# Patient Record
Sex: FEMALE | Race: WHITE | NOT HISPANIC OR LATINO | URBAN - NONMETROPOLITAN AREA
[De-identification: names, ages, dates, MRNs, and addresses within clinical notes are randomized per-mention and may not be internally consistent; named-entity substitution may affect disease eponyms.]

---

## 2018-09-27 ENCOUNTER — APPOINTMENT (RX ONLY)
Dept: URBAN - NONMETROPOLITAN AREA CLINIC 13 | Facility: CLINIC | Age: 23
Setting detail: DERMATOLOGY
End: 2018-09-27

## 2018-09-27 DIAGNOSIS — Z41.9 ENCOUNTER FOR PROCEDURE FOR PURPOSES OTHER THAN REMEDYING HEALTH STATE, UNSPECIFIED: ICD-10-CM

## 2018-09-27 PROCEDURE — ? MEDICAL CONSULTATION: DYNAMIC RHYTIDES

## 2018-09-27 PROCEDURE — ? ADDITIONAL NOTES

## 2018-09-27 NOTE — PROCEDURE: ADDITIONAL NOTES
Additional Notes: patient is interested in botox to her forehead and glabella.  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  she has never had botox before.  had recent dermal filler to her lips.  she feels her lips are still slightly lumpy.  given her photo shoot tomorrow, she will wait for botox until after her shoot (given slight chance of bruising).  we also discussed dissolving some of her  her lips should the lump persist.  she may be interested in a small amount of filler to her smile lines, but again, discussed risk of bruising and she will wait until no important photo shoot is pending.  return early next week for botox.  $75 consultation fee collected today.
Detail Level: Simple

## 2018-10-03 ENCOUNTER — APPOINTMENT (RX ONLY)
Dept: URBAN - NONMETROPOLITAN AREA CLINIC 13 | Facility: CLINIC | Age: 23
Setting detail: DERMATOLOGY
End: 2018-10-03

## 2018-10-03 DIAGNOSIS — Z41.9 ENCOUNTER FOR PROCEDURE FOR PURPOSES OTHER THAN REMEDYING HEALTH STATE, UNSPECIFIED: ICD-10-CM

## 2018-10-03 PROCEDURE — ? BOTOX

## 2018-10-03 NOTE — PROCEDURE: BOTOX
Periorbital Skin Units: 0
Glabellar Complex Units: 20
Expiration Date (Month Year): 04/2021
Consent: Written consent obtained. Risks include but not limited to lid/brow ptosis, bruising, swelling, diplopia, temporary effect, incomplete chemical denervation.
Price (Use Numbers Only, No Special Characters Or $): 178.00
Forehead Units: 3
Lot #: [id number]
Post-Care Instructions: Patient instructed to not lie down for 4 hours and limit physical activity for 24 hours. Patient instructed not to travel by airplane for 48 hours.
Dilution (U/0.1 Cc): 1
Detail Level: Detailed

## 2018-10-11 ENCOUNTER — APPOINTMENT (RX ONLY)
Dept: URBAN - NONMETROPOLITAN AREA CLINIC 13 | Facility: CLINIC | Age: 23
Setting detail: DERMATOLOGY
End: 2018-10-11

## 2018-10-11 DIAGNOSIS — Z41.9 ENCOUNTER FOR PROCEDURE FOR PURPOSES OTHER THAN REMEDYING HEALTH STATE, UNSPECIFIED: ICD-10-CM

## 2018-10-11 PROCEDURE — ? ADDITIONAL NOTES

## 2018-10-11 PROCEDURE — 99211 OFF/OP EST MAY X REQ PHY/QHP: CPT

## 2018-10-11 NOTE — PROCEDURE: ADDITIONAL NOTES
Detail Level: Simple
Additional Notes: patient with persistent lump to her right upper lip following lip augmentation in [place] over 1 month ago.  she did not see significant improvement after injection of 5 units hyaluronidase 1 week ago.  I retreated with with an additional 10 units today.  \\n\\npatient is very pleased with the relaxation to her forehead following botox injection nearly 2 weeks ago.  no charge for today's visit.

## 2018-11-29 ENCOUNTER — APPOINTMENT (RX ONLY)
Dept: URBAN - NONMETROPOLITAN AREA CLINIC 13 | Facility: CLINIC | Age: 23
Setting detail: DERMATOLOGY
End: 2018-11-29

## 2018-11-29 DIAGNOSIS — Z41.9 ENCOUNTER FOR PROCEDURE FOR PURPOSES OTHER THAN REMEDYING HEALTH STATE, UNSPECIFIED: ICD-10-CM

## 2018-11-29 PROCEDURE — ? BOTOX

## 2018-11-29 NOTE — PROCEDURE: BOTOX
Additional Area 6 Units: 0
Detail Level: Detailed
Expiration Date (Month Year): 04/2021
Price (Use Numbers Only, No Special Characters Or $): 275.00
Post-Care Instructions: Patient instructed to not lie down for 4 hours and limit physical activity for 24 hours. Patient instructed not to travel by airplane for 48 hours.
Consent: Written consent obtained. Risks include but not limited to lid/brow ptosis, bruising, swelling, diplopia, temporary effect, incomplete chemical denervation.
Dilution (U/0.1 Cc): 1
Inferior Lateral Orbicularis Oculi Units: 10
Lot #: [id number]
Glabellar Complex Units: 15

## 2018-12-03 ENCOUNTER — APPOINTMENT (RX ONLY)
Dept: URBAN - NONMETROPOLITAN AREA CLINIC 13 | Facility: CLINIC | Age: 23
Setting detail: DERMATOLOGY
End: 2018-12-03

## 2018-12-03 DIAGNOSIS — Z41.9 ENCOUNTER FOR PROCEDURE FOR PURPOSES OTHER THAN REMEDYING HEALTH STATE, UNSPECIFIED: ICD-10-CM

## 2018-12-03 PROCEDURE — ? LUTRONIC SPECTRA

## 2018-12-03 ASSESSMENT — LOCATION SIMPLE DESCRIPTION DERM: LOCATION SIMPLE: LEFT UPPER ARM

## 2018-12-03 ASSESSMENT — LOCATION DETAILED DESCRIPTION DERM: LOCATION DETAILED: LEFT ANTERIOR PROXIMAL UPPER ARM

## 2018-12-03 ASSESSMENT — LOCATION ZONE DERM: LOCATION ZONE: ARM

## 2018-12-03 NOTE — PROCEDURE: LUTRONIC SPECTRA
External Cooling Fan Speed: 0
Spot Size In Mm: 2
Treatment Number: 1
Frequency In Hz: 5
Post-Care Instructions: I reviewed with the patient in detail post-care instructions. Patient should avoid sunlight and wear sun protection.
Endpoint: Immediate endpoint whitening and pinpoint bleeding. Vaseline and ice applied. Post care reviewed with patient.
Was Spectra Carbon Lotion Applied?: No
Price (Use Numbers Only, No Special Characters Or $): 100.00
Fluence: 0.8
Topical Anesthesia Type: 23% Lidocaine 7% Tetracaine
Consent: Written consent obtained, risks reviewed including but not limited to crusting, scabbing, blistering, scarring, darker or lighter pigmentary change, systemic reactions, ulceration, incidental hair removal, bruising, and/or incomplete removal.
Spot Size In Mm: 8
Detail Level: Detailed

## 2019-01-17 ENCOUNTER — APPOINTMENT (RX ONLY)
Dept: URBAN - NONMETROPOLITAN AREA CLINIC 13 | Facility: CLINIC | Age: 24
Setting detail: DERMATOLOGY
End: 2019-01-17

## 2019-01-17 DIAGNOSIS — Z41.9 ENCOUNTER FOR PROCEDURE FOR PURPOSES OTHER THAN REMEDYING HEALTH STATE, UNSPECIFIED: ICD-10-CM

## 2019-01-17 PROCEDURE — ? ADDITIONAL NOTES

## 2019-01-17 PROCEDURE — ? JUVEDERM ULTRA INJECTION

## 2019-01-17 NOTE — PROCEDURE: ADDITIONAL NOTES
Additional Notes: Pt does not like lateral column in her lips which was administered six months ago. Nodules on lower lip are still present. Filler administered to middle of upper and lower lip, none in lateral.
Detail Level: Simple

## 2019-01-17 NOTE — PROCEDURE: JUVEDERM ULTRA INJECTION
Include Cannula Information In Note?: No
Temple [place]s Filler Volume In Cc: 0
Post-Care Instructions: Patient instructed to apply ice to reduce swelling.
Anesthesia Type: 1% lidocaine with epinephrine
Additional Anesthesia Volume In Cc: 6
Expiration Date (Month Year): 8/19
Topical Anesthesia?: BLT gel (benzocaine 20%, lidocaine 6%, tetracaine 4%)
Anesthesia Volume In Cc: 0.5
Map Statement: See Attach Map for Complete Details
Price (Use Numbers Only, No Special Characters Or $): 063
Lot #: [id number]
Nasolabial Folds Filler Volume In Cc: 0.3
Filler: Juvederm Ultra
Procedural Text: The filler was administered to the treatment areas noted above.
Consent: Written consent obtained. Risks include but not limited to bruising, beading, irregular texture, ulceration, infection, allergic reaction, scar formation, incomplete augmentation, temporary nature, procedural pain.
Vermilion Lips Filler Volume In Cc: 0.7
Detail Level: Detailed

## 2019-01-25 ENCOUNTER — APPOINTMENT (RX ONLY)
Dept: URBAN - NONMETROPOLITAN AREA CLINIC 13 | Facility: CLINIC | Age: 24
Setting detail: DERMATOLOGY
End: 2019-01-25

## 2019-01-25 DIAGNOSIS — Z41.9 ENCOUNTER FOR PROCEDURE FOR PURPOSES OTHER THAN REMEDYING HEALTH STATE, UNSPECIFIED: ICD-10-CM

## 2019-01-25 PROCEDURE — ? BOTOX

## 2019-01-25 ASSESSMENT — LOCATION SIMPLE DESCRIPTION DERM
LOCATION SIMPLE: LEFT FOREHEAD
LOCATION SIMPLE: SUPERIOR FOREHEAD
LOCATION SIMPLE: LEFT SCALP
LOCATION SIMPLE: RIGHT SCALP
LOCATION SIMPLE: GLABELLA
LOCATION SIMPLE: RIGHT FOREHEAD

## 2019-01-25 ASSESSMENT — LOCATION DETAILED DESCRIPTION DERM
LOCATION DETAILED: LEFT INFERIOR MEDIAL FOREHEAD
LOCATION DETAILED: LEFT SUPERIOR LATERAL FOREHEAD
LOCATION DETAILED: GLABELLA
LOCATION DETAILED: RIGHT MEDIAL FRONTAL SCALP
LOCATION DETAILED: RIGHT INFERIOR MEDIAL FOREHEAD
LOCATION DETAILED: RIGHT SUPERIOR LATERAL FOREHEAD
LOCATION DETAILED: LEFT CENTRAL FRONTAL SCALP
LOCATION DETAILED: SUPERIOR MID FOREHEAD

## 2019-01-25 ASSESSMENT — LOCATION ZONE DERM
LOCATION ZONE: FACE
LOCATION ZONE: SCALP

## 2019-01-25 NOTE — PROCEDURE: BOTOX
Lot #: [id number]
Expiration Date (Month Year): 11/20
Lateral Platysmal Bands Units: 0
Additional Area 4 Location: chin
Post-Care Instructions: Patient instructed to limit physical activity for 24 hours. Patient instructed not to rub or massage areas injected for 24 hours. 4 units ps to nasalis. SES
Additional Area 1 Location: Total units
Detail Level: Detailed
Consent: Written consent obtained. Risks include but not limited to lid/brow ptosis, bruising, swelling, diplopia, temporary effect, incomplete chemical denervation.
Additional Area 2 Location: [facility]
Forehead Units: 5
Glabellar Complex Units: 15
Additional Area 6 Location: professional samples
Dilution (U/0.1 Cc): 1
Price (Use Numbers Only, No Special Characters Or $): 0.00
Additional Area 3 Location: [facility]

## 2019-02-15 ENCOUNTER — APPOINTMENT (RX ONLY)
Dept: URBAN - NONMETROPOLITAN AREA CLINIC 13 | Facility: CLINIC | Age: 24
Setting detail: DERMATOLOGY
End: 2019-02-15

## 2019-02-15 DIAGNOSIS — Z41.9 ENCOUNTER FOR PROCEDURE FOR PURPOSES OTHER THAN REMEDYING HEALTH STATE, UNSPECIFIED: ICD-10-CM

## 2019-02-15 PROCEDURE — ? Q-SWITCHED LASER

## 2019-02-15 ASSESSMENT — LOCATION ZONE DERM: LOCATION ZONE: ARM

## 2019-02-15 ASSESSMENT — LOCATION SIMPLE DESCRIPTION DERM: LOCATION SIMPLE: LEFT UPPER ARM

## 2019-02-15 ASSESSMENT — LOCATION DETAILED DESCRIPTION DERM: LOCATION DETAILED: LEFT ANTERIOR PROXIMAL UPPER ARM

## 2019-02-15 NOTE — PROCEDURE: Q-SWITCHED LASER
Spot Size In Mm: 8
Treatment Number: 1
Post-Care Instructions: I reviewed with the patient in detail post-care instructions. Patient should avoid sunlight and wear sun protection.  Patient had been in [place] for a few days, with sunscreen.  We talked again about how melasma is hard to treat and can come back.
Length Of Topical Anesthesia Application (Optional): 30 minutes
Spot Size In Mm: 4
Topical Anesthesia Type: 23% Lidocaine 7% Tetracaine
External Cooling Fan Speed: 0
Frequency In Hz: 5
Spot Size In Mm: 2
Detail Level: Detailed
Endpoint: Immediate endpoint erythema
Price (Use Numbers Only, No Special Characters Or $): 100
Consent: Written consent obtained, risks reviewed including but not limited to crusting, scabbing, blistering, scarring, darker or lighter pigmentary change, systemic reactions, ulceration, incidental hair removal, bruising, and/or incomplete removal.